# Patient Record
Sex: MALE | Race: BLACK OR AFRICAN AMERICAN | NOT HISPANIC OR LATINO | URBAN - METROPOLITAN AREA
[De-identification: names, ages, dates, MRNs, and addresses within clinical notes are randomized per-mention and may not be internally consistent; named-entity substitution may affect disease eponyms.]

---

## 2018-04-02 ENCOUNTER — OUTPATIENT (OUTPATIENT)
Dept: OUTPATIENT SERVICES | Facility: HOSPITAL | Age: 58
LOS: 1 days | End: 2018-04-02
Payer: COMMERCIAL

## 2018-04-02 DIAGNOSIS — C61 MALIGNANT NEOPLASM OF PROSTATE: ICD-10-CM

## 2018-04-02 LAB — SURGICAL PATHOLOGY STUDY: SIGNIFICANT CHANGE UP

## 2018-04-02 PROCEDURE — 88321 CONSLTJ&REPRT SLD PREP ELSWR: CPT

## 2018-04-09 ENCOUNTER — INPATIENT (INPATIENT)
Facility: HOSPITAL | Age: 58
LOS: 2 days | Discharge: HOME | DRG: 708 | End: 2018-04-12
Attending: UROLOGY | Admitting: UROLOGY
Payer: COMMERCIAL

## 2018-04-09 VITALS
WEIGHT: 193.12 LBS | DIASTOLIC BLOOD PRESSURE: 72 MMHG | TEMPERATURE: 97 F | SYSTOLIC BLOOD PRESSURE: 109 MMHG | HEIGHT: 70 IN | OXYGEN SATURATION: 98 % | RESPIRATION RATE: 18 BRPM | HEART RATE: 52 BPM

## 2018-04-09 DIAGNOSIS — C61 MALIGNANT NEOPLASM OF PROSTATE: ICD-10-CM

## 2018-04-09 DIAGNOSIS — Z98.890 OTHER SPECIFIED POSTPROCEDURAL STATES: Chronic | ICD-10-CM

## 2018-04-09 LAB
ANION GAP SERPL CALC-SCNC: 12 MMOL/L — SIGNIFICANT CHANGE UP (ref 5–17)
BUN SERPL-MCNC: 15 MG/DL — SIGNIFICANT CHANGE UP (ref 7–23)
CALCIUM SERPL-MCNC: 8.7 MG/DL — SIGNIFICANT CHANGE UP (ref 8.4–10.5)
CHLORIDE SERPL-SCNC: 100 MMOL/L — SIGNIFICANT CHANGE UP (ref 96–108)
CO2 SERPL-SCNC: 24 MMOL/L — SIGNIFICANT CHANGE UP (ref 22–31)
CREAT SERPL-MCNC: 1.25 MG/DL — SIGNIFICANT CHANGE UP (ref 0.5–1.3)
GLUCOSE SERPL-MCNC: 124 MG/DL — HIGH (ref 70–99)
HCT VFR BLD CALC: 37.2 % — LOW (ref 39–50)
HGB BLD-MCNC: 12.1 G/DL — LOW (ref 13–17)
MCHC RBC-ENTMCNC: 29.9 PG — SIGNIFICANT CHANGE UP (ref 27–34)
MCHC RBC-ENTMCNC: 32.5 G/DL — SIGNIFICANT CHANGE UP (ref 32–36)
MCV RBC AUTO: 91.9 FL — SIGNIFICANT CHANGE UP (ref 80–100)
PLATELET # BLD AUTO: 192 K/UL — SIGNIFICANT CHANGE UP (ref 150–400)
POTASSIUM SERPL-MCNC: 4.3 MMOL/L — SIGNIFICANT CHANGE UP (ref 3.5–5.3)
POTASSIUM SERPL-SCNC: 4.3 MMOL/L — SIGNIFICANT CHANGE UP (ref 3.5–5.3)
RBC # BLD: 4.05 M/UL — LOW (ref 4.2–5.8)
RBC # FLD: 11.5 % — SIGNIFICANT CHANGE UP (ref 10.3–16.9)
SODIUM SERPL-SCNC: 136 MMOL/L — SIGNIFICANT CHANGE UP (ref 135–145)
WBC # BLD: 12.3 K/UL — HIGH (ref 3.8–10.5)
WBC # FLD AUTO: 12.3 K/UL — HIGH (ref 3.8–10.5)

## 2018-04-09 RX ORDER — BENZOCAINE AND MENTHOL 5; 1 G/100ML; G/100ML
1 LIQUID ORAL
Qty: 0 | Refills: 0 | Status: DISCONTINUED | OUTPATIENT
Start: 2018-04-09 | End: 2018-04-12

## 2018-04-09 RX ORDER — HYDROMORPHONE HYDROCHLORIDE 2 MG/ML
0.5 INJECTION INTRAMUSCULAR; INTRAVENOUS; SUBCUTANEOUS ONCE
Qty: 0 | Refills: 0 | Status: DISCONTINUED | OUTPATIENT
Start: 2018-04-09 | End: 2018-04-12

## 2018-04-09 RX ORDER — ACETAMINOPHEN 500 MG
650 TABLET ORAL EVERY 6 HOURS
Qty: 0 | Refills: 0 | Status: DISCONTINUED | OUTPATIENT
Start: 2018-04-09 | End: 2018-04-12

## 2018-04-09 RX ORDER — SENNA PLUS 8.6 MG/1
2 TABLET ORAL AT BEDTIME
Qty: 0 | Refills: 0 | Status: DISCONTINUED | OUTPATIENT
Start: 2018-04-09 | End: 2018-04-12

## 2018-04-09 RX ORDER — OXYBUTYNIN CHLORIDE 5 MG
5 TABLET ORAL THREE TIMES A DAY
Qty: 0 | Refills: 0 | Status: DISCONTINUED | OUTPATIENT
Start: 2018-04-09 | End: 2018-04-12

## 2018-04-09 RX ORDER — OXYCODONE AND ACETAMINOPHEN 5; 325 MG/1; MG/1
1 TABLET ORAL EVERY 4 HOURS
Qty: 0 | Refills: 0 | Status: DISCONTINUED | OUTPATIENT
Start: 2018-04-09 | End: 2018-04-12

## 2018-04-09 RX ORDER — ONDANSETRON 8 MG/1
4 TABLET, FILM COATED ORAL EVERY 6 HOURS
Qty: 0 | Refills: 0 | Status: DISCONTINUED | OUTPATIENT
Start: 2018-04-09 | End: 2018-04-12

## 2018-04-09 RX ORDER — SODIUM CHLORIDE 9 MG/ML
1000 INJECTION INTRAMUSCULAR; INTRAVENOUS; SUBCUTANEOUS
Qty: 0 | Refills: 0 | Status: DISCONTINUED | OUTPATIENT
Start: 2018-04-09 | End: 2018-04-11

## 2018-04-09 RX ORDER — DIAZEPAM 5 MG
5 TABLET ORAL THREE TIMES A DAY
Qty: 0 | Refills: 0 | Status: DISCONTINUED | OUTPATIENT
Start: 2018-04-09 | End: 2018-04-12

## 2018-04-09 RX ORDER — CEFTRIAXONE 500 MG/1
1000 INJECTION, POWDER, FOR SOLUTION INTRAMUSCULAR; INTRAVENOUS EVERY 24 HOURS
Qty: 0 | Refills: 0 | Status: COMPLETED | OUTPATIENT
Start: 2018-04-09 | End: 2018-04-10

## 2018-04-09 RX ORDER — CEFTRIAXONE 500 MG/1
1 INJECTION, POWDER, FOR SOLUTION INTRAMUSCULAR; INTRAVENOUS EVERY 24 HOURS
Qty: 0 | Refills: 0 | Status: DISCONTINUED | OUTPATIENT
Start: 2018-04-09 | End: 2018-04-09

## 2018-04-09 RX ORDER — DOCUSATE SODIUM 100 MG
100 CAPSULE ORAL THREE TIMES A DAY
Qty: 0 | Refills: 0 | Status: DISCONTINUED | OUTPATIENT
Start: 2018-04-09 | End: 2018-04-12

## 2018-04-09 RX ORDER — OXYCODONE AND ACETAMINOPHEN 5; 325 MG/1; MG/1
2 TABLET ORAL EVERY 6 HOURS
Qty: 0 | Refills: 0 | Status: DISCONTINUED | OUTPATIENT
Start: 2018-04-09 | End: 2018-04-12

## 2018-04-09 RX ORDER — LIDOCAINE 4 G/100G
1 CREAM TOPICAL
Qty: 0 | Refills: 0 | Status: DISCONTINUED | OUTPATIENT
Start: 2018-04-09 | End: 2018-04-12

## 2018-04-09 RX ADMIN — Medication 100 MILLIGRAM(S): at 14:46

## 2018-04-09 RX ADMIN — Medication 5 MILLIGRAM(S): at 11:33

## 2018-04-09 RX ADMIN — OXYCODONE AND ACETAMINOPHEN 1 TABLET(S): 5; 325 TABLET ORAL at 19:29

## 2018-04-09 RX ADMIN — BENZOCAINE AND MENTHOL 1 LOZENGE: 5; 1 LIQUID ORAL at 22:21

## 2018-04-09 RX ADMIN — Medication 100 MILLIGRAM(S): at 21:08

## 2018-04-09 RX ADMIN — Medication 650 MILLIGRAM(S): at 16:53

## 2018-04-09 RX ADMIN — Medication 650 MILLIGRAM(S): at 14:46

## 2018-04-09 RX ADMIN — Medication 5 MILLIGRAM(S): at 18:03

## 2018-04-09 RX ADMIN — CEFTRIAXONE 1000 MILLIGRAM(S): 500 INJECTION, POWDER, FOR SOLUTION INTRAMUSCULAR; INTRAVENOUS at 16:51

## 2018-04-09 RX ADMIN — OXYCODONE AND ACETAMINOPHEN 1 TABLET(S): 5; 325 TABLET ORAL at 20:30

## 2018-04-09 RX ADMIN — Medication 5 MILLIGRAM(S): at 19:29

## 2018-04-09 RX ADMIN — Medication 5 MILLIGRAM(S): at 12:05

## 2018-04-09 RX ADMIN — SENNA PLUS 2 TABLET(S): 8.6 TABLET ORAL at 21:08

## 2018-04-09 NOTE — BRIEF OPERATIVE NOTE - PROCEDURE
<<-----Click on this checkbox to enter Procedure Lysis of adhesions  04/09/2018    Active  RUDOLPH  Prostatectomy, laparoscopic, using da Aguilar Si system  04/09/2018    Active  RUDOLPH

## 2018-04-09 NOTE — H&P ADULT - HISTORY OF PRESENT ILLNESS
73M history prostate cancer here for robotic-assisted laparoscopic prostatectomy. No new complaints since outpatient visit

## 2018-04-09 NOTE — PROGRESS NOTE ADULT - SUBJECTIVE AND OBJECTIVE BOX
POC    Patient is a 58y old  Male who presents with a chief complaint of prostate ca (09 Apr 2018 08:08) s/p RALP    Pt denies severe pain, CP, SOB, n/v        Vital Signs Last 24 Hrs  T(C): 36.3 (09 Apr 2018 05:51), Max: 36.3 (09 Apr 2018 05:51)  T(F): 97.3 (09 Apr 2018 05:51), Max: 97.3 (09 Apr 2018 05:51)  HR: 56 (09 Apr 2018 12:25) (52 - 76)  BP: 127/76 (09 Apr 2018 12:25) (109/72 - 146/74)  BP(mean): 98 (09 Apr 2018 12:25) (93 - 104)  RR: 18 (09 Apr 2018 12:25) (10 - 26)  SpO2: 100% (09 Apr 2018 12:25) (90% - 100%)    I&O's Summary    09 Apr 2018 07:01  -  09 Apr 2018 13:03  --------------------------------------------------------  IN: 0 mL / OUT: 70 mL / NET: -70 mL        Gen: NAD    Abd: incisions CDI, marlon ttp, softly distended, no guarding    : FC intact and draining yellow, nml external male genitalia                          12.1   12.3  )-----------( 192      ( 09 Apr 2018 12:03 )             37.2     04-09    136  |  100  |  15  ----------------------------<  124<H>  4.3   |  24  |  1.25    Ca    8.7      09 Apr 2018 12:03      cultures    A/P  58M s/p RALP  -c/w abx  -monitor UO, c/w FC  -DVT ppx w/SCDs and OOB  -diet: clears  -pain meds PRN  -bowel regimen  -OOB/IS

## 2018-04-09 NOTE — PROGRESS NOTE ADULT - SUBJECTIVE AND OBJECTIVE BOX
Postop:  pt denies chest pain, sob, nv or severe abdominal pain       Vital Signs Last 24 Hrs  T(C): 36.3 (09 Apr 2018 05:51), Max: 36.3 (09 Apr 2018 05:51)  T(F): 97.3 (09 Apr 2018 05:51), Max: 97.3 (09 Apr 2018 05:51)  HR: 52 (09 Apr 2018 13:10) (52 - 76)  BP: 132/80 (09 Apr 2018 13:10) (109/72 - 146/74)  BP(mean): 102 (09 Apr 2018 13:10) (93 - 104)  RR: 10 (09 Apr 2018 13:10) (10 - 26)  SpO2: 98% (09 Apr 2018 13:10) (90% - 100%)  I&O's Summary    09 Apr 2018 07:01  -  09 Apr 2018 13:17  --------------------------------------------------------  IN: 0 mL / OUT: 70 mL / NET: -70 mL        PE:  Gen: nad  Abd: softly distended, marlon ttp, incisions cdi   : johnson draining clear    LABS:                        12.1   12.3  )-----------( 192      ( 09 Apr 2018 12:03 )             37.2     04-09    136  |  100  |  15  ----------------------------<  124<H>  4.3   |  24  |  1.25    Ca    8.7      09 Apr 2018 12:03        Cultures      A/P: 58M hx cap s/p ralp 4/9  1- johnson- monitor uop  2- HOWARD- monitor output  3- clears  4- pain meds prn  5- ceftriaxone  6- OOB amb, IS

## 2018-04-10 LAB — CREAT FLD-MCNC: 1.22 MG/DL — SIGNIFICANT CHANGE UP

## 2018-04-10 RX ORDER — OXYBUTYNIN CHLORIDE 5 MG
1 TABLET ORAL
Qty: 6 | Refills: 0 | OUTPATIENT
Start: 2018-04-10 | End: 2018-04-15

## 2018-04-10 RX ORDER — DOCUSATE SODIUM 100 MG
1 CAPSULE ORAL
Qty: 14 | Refills: 0 | OUTPATIENT
Start: 2018-04-10 | End: 2018-04-16

## 2018-04-10 RX ORDER — MOXIFLOXACIN HYDROCHLORIDE TABLETS, 400 MG 400 MG/1
1 TABLET, FILM COATED ORAL
Qty: 10 | Refills: 0 | OUTPATIENT
Start: 2018-04-10 | End: 2018-04-14

## 2018-04-10 RX ADMIN — OXYCODONE AND ACETAMINOPHEN 1 TABLET(S): 5; 325 TABLET ORAL at 15:01

## 2018-04-10 RX ADMIN — OXYCODONE AND ACETAMINOPHEN 1 TABLET(S): 5; 325 TABLET ORAL at 06:11

## 2018-04-10 RX ADMIN — OXYCODONE AND ACETAMINOPHEN 1 TABLET(S): 5; 325 TABLET ORAL at 13:06

## 2018-04-10 RX ADMIN — Medication 100 MILLIGRAM(S): at 21:01

## 2018-04-10 RX ADMIN — OXYCODONE AND ACETAMINOPHEN 1 TABLET(S): 5; 325 TABLET ORAL at 05:11

## 2018-04-10 RX ADMIN — SENNA PLUS 2 TABLET(S): 8.6 TABLET ORAL at 21:01

## 2018-04-10 RX ADMIN — Medication 650 MILLIGRAM(S): at 21:18

## 2018-04-10 RX ADMIN — Medication 5 MILLIGRAM(S): at 13:06

## 2018-04-10 RX ADMIN — Medication 650 MILLIGRAM(S): at 22:18

## 2018-04-10 RX ADMIN — Medication 100 MILLIGRAM(S): at 13:06

## 2018-04-10 RX ADMIN — Medication 100 MILLIGRAM(S): at 05:05

## 2018-04-10 RX ADMIN — Medication 5 MILLIGRAM(S): at 21:18

## 2018-04-10 RX ADMIN — CEFTRIAXONE 1000 MILLIGRAM(S): 500 INJECTION, POWDER, FOR SOLUTION INTRAMUSCULAR; INTRAVENOUS at 15:54

## 2018-04-10 NOTE — DISCHARGE NOTE ADULT - MEDICATION SUMMARY - MEDICATIONS TO TAKE
I will START or STAY ON the medications listed below when I get home from the hospital:    Colace sodium 100 mg oral capsule  -- 1 cap(s) by mouth 2 times a day, As Needed -for constipation  -- Medication should be taken with plenty of water.    -- Indication: For for constipation    Cipro 500 mg oral tablet  -- 1 tab(s) by mouth every 12 hours start day before apt  -- Avoid prolonged or excessive exposure to direct and/or artificial sunlight while taking this medication.  Check with your doctor before becoming pregnant.  Do not take dairy products, antacids, or iron preparations within one hour of this medication.  Finish all this medication unless otherwise directed by prescriber.  Medication should be taken with plenty of water.    -- Indication: For infection prophylaxis    Ditropan XL 15 mg/24 hr oral tablet, extended release  -- 1 tab(s) by mouth once a day, As Needed -for bladder spasms stop day before apt  -- May cause drowsiness.  Alcohol may intensify this effect.  Use care when operating dangerous machinery.  Swallow whole.  Do not crush.    -- Indication: For Bladder spasms I will START or STAY ON the medications listed below when I get home from the hospital:    Colace 100 mg oral capsule  -- 1 cap(s) by mouth 2 times a day -for constipation MDD:2   -- Medication should be taken with plenty of water.    -- Indication: For stool softener    Cipro 500 mg oral tablet  -- 1 tab(s) by mouth every 12 hours MDD:2  -- Avoid prolonged or excessive exposure to direct and/or artificial sunlight while taking this medication.  Check with your doctor before becoming pregnant.  Do not take dairy products, antacids, or iron preparations within one hour of this medication.  Finish all this medication unless otherwise directed by prescriber.  Medication should be taken with plenty of water.    -- Indication: For antibiotic to begin one day prior to follow up visit with Dr. Hawkins    oxybutynin 5 mg oral tablet  -- 1 tab(s) by mouth 3 times a day -for bladder spasms MDD:3   -- May cause drowsiness.  Alcohol may intensify this effect.  Use care when operating dangerous machinery.    -- Indication: For for bladder spasms. Stop taking one day prior to follow up with Dr. Hawkins.

## 2018-04-10 NOTE — DISCHARGE NOTE ADULT - CARE PLAN
Principal Discharge DX:	Prostate cancer  Goal:	improvement after surgery  Assessment and plan of treatment:	Advance diet with BM. Activity as tolerated. Aguilera to leg bag. No heavy lifting or straining. Keep incisions CDI. If fever >100.4 or any change or worsening of symptoms please call doctor or report to ED. Make follow up appointment with Dr Hawkins call office [phone]

## 2018-04-10 NOTE — PROGRESS NOTE ADULT - SUBJECTIVE AND OBJECTIVE BOX
AM Note    No acute events overnight.      Vital Signs Last 24 Hrs  T(C): 37.2 (04-10-18 @ 01:14), Max: 37.4 (04-09-18 @ 21:14)  T(F): 99 (04-10-18 @ 01:14), Max: 99.3 (04-09-18 @ 21:14)  HR: 68 (04-10-18 @ 01:14) (52 - 76)  BP: 110/85 (04-10-18 @ 01:14) (109/72 - 153/83)  BP(mean): 97 (04-09-18 @ 13:40) (93 - 104)  RR: 18 (04-10-18 @ 01:14) (10 - 26)  SpO2: 97% (04-10-18 @ 01:14) (90% - 100%)     09 Apr 2018 12:03    136    |  100    |  15     ----------------------------<  124    4.3     |  24     |  1.25     Ca    8.7        09 Apr 2018 12:03                            12.1   12.3  )-----------( 192      ( 09 Apr 2018 12:03 )             37.2         I&O's Summary    09 Apr 2018 07:01  -  10 Apr 2018 04:17  --------------------------------------------------------  IN: 2000 mL / OUT: 2595 mL / NET: -595 mL          PHYSICAL EXAM:    GEN: nad  ABD: soft, ntnd, incsions cdi, HOWARD in place draining  : elizabeth in place draining clear

## 2018-04-10 NOTE — PROGRESS NOTE ADULT - PROBLEM SELECTOR PLAN 1
- ceftriaxone  -CLD  -johnson, monitor uop  -HOWARD drain, monitor op  -Surgery following for poss bowel injury  dvt ppx, SCDs,   -continue present care

## 2018-04-10 NOTE — DISCHARGE NOTE ADULT - HOSPITAL COURSE
58M history cap underwent RALP 4/9 . Tolerated procedure well. VSS, afebrile and hemodynamically stable. 58M history cap underwent RALP 4/9 . Tolerated procedure well. VSS, afebrile and hemodynamically stable. Homw with johnson to leg bag to be removed as outpt. HOWARD removed prior to discharge.

## 2018-04-10 NOTE — DISCHARGE NOTE ADULT - CARE PROVIDER_API CALL
Ozzie Hawkins), Urology  [address] [place] [address], [place] [id number]  Phone: [phone]  Fax: [phone]

## 2018-04-10 NOTE — DISCHARGE NOTE ADULT - PATIENT PORTAL LINK FT
You can access the [facility] Patient Portal, offered by [facility], by registering with the following website: [email or web address]

## 2018-04-10 NOTE — DISCHARGE NOTE ADULT - PLAN OF CARE
improvement after surgery Advance diet with BM. Activity as tolerated. Aguilera to leg bag. No heavy lifting or straining. Keep incisions CDI. If fever >100.4 or any change or worsening of symptoms please call doctor or report to ED. Make follow up appointment with Dr Hawkins call office [phone]

## 2018-04-11 LAB
ANION GAP SERPL CALC-SCNC: 10 MMOL/L — SIGNIFICANT CHANGE UP (ref 5–17)
BASOPHILS NFR BLD AUTO: 0.2 % — SIGNIFICANT CHANGE UP (ref 0–2)
BUN SERPL-MCNC: 8 MG/DL — SIGNIFICANT CHANGE UP (ref 7–23)
CALCIUM SERPL-MCNC: 8.8 MG/DL — SIGNIFICANT CHANGE UP (ref 8.4–10.5)
CHLORIDE SERPL-SCNC: 102 MMOL/L — SIGNIFICANT CHANGE UP (ref 96–108)
CO2 SERPL-SCNC: 28 MMOL/L — SIGNIFICANT CHANGE UP (ref 22–31)
CREAT FLD-MCNC: 1.22 MG/DL — SIGNIFICANT CHANGE UP
CREAT SERPL-MCNC: 1.12 MG/DL — SIGNIFICANT CHANGE UP (ref 0.5–1.3)
EOSINOPHIL NFR BLD AUTO: 0.4 % — SIGNIFICANT CHANGE UP (ref 0–6)
GLUCOSE SERPL-MCNC: 117 MG/DL — HIGH (ref 70–99)
HCT VFR BLD CALC: 39.7 % — SIGNIFICANT CHANGE UP (ref 39–50)
HGB BLD-MCNC: 12.6 G/DL — LOW (ref 13–17)
LYMPHOCYTES # BLD AUTO: 17.8 % — SIGNIFICANT CHANGE UP (ref 13–44)
MAGNESIUM SERPL-MCNC: 1.8 MG/DL — SIGNIFICANT CHANGE UP (ref 1.6–2.6)
MCHC RBC-ENTMCNC: 29 PG — SIGNIFICANT CHANGE UP (ref 27–34)
MCHC RBC-ENTMCNC: 31.7 G/DL — LOW (ref 32–36)
MCV RBC AUTO: 91.3 FL — SIGNIFICANT CHANGE UP (ref 80–100)
MONOCYTES NFR BLD AUTO: 13.3 % — SIGNIFICANT CHANGE UP (ref 2–14)
NEUTROPHILS NFR BLD AUTO: 68.3 % — SIGNIFICANT CHANGE UP (ref 43–77)
PHOSPHATE SERPL-MCNC: 3.3 MG/DL — SIGNIFICANT CHANGE UP (ref 2.5–4.5)
PLATELET # BLD AUTO: 164 K/UL — SIGNIFICANT CHANGE UP (ref 150–400)
POTASSIUM SERPL-MCNC: 4.2 MMOL/L — SIGNIFICANT CHANGE UP (ref 3.5–5.3)
POTASSIUM SERPL-SCNC: 4.2 MMOL/L — SIGNIFICANT CHANGE UP (ref 3.5–5.3)
RBC # BLD: 4.35 M/UL — SIGNIFICANT CHANGE UP (ref 4.2–5.8)
RBC # FLD: 12 % — SIGNIFICANT CHANGE UP (ref 10.3–16.9)
SODIUM SERPL-SCNC: 140 MMOL/L — SIGNIFICANT CHANGE UP (ref 135–145)
WBC # BLD: 5.8 K/UL — SIGNIFICANT CHANGE UP (ref 3.8–10.5)
WBC # FLD AUTO: 5.8 K/UL — SIGNIFICANT CHANGE UP (ref 3.8–10.5)

## 2018-04-11 RX ORDER — MAGNESIUM OXIDE 400 MG ORAL TABLET 241.3 MG
400 TABLET ORAL ONCE
Qty: 0 | Refills: 0 | Status: COMPLETED | OUTPATIENT
Start: 2018-04-11 | End: 2018-04-11

## 2018-04-11 RX ADMIN — Medication 100 MILLIGRAM(S): at 17:23

## 2018-04-11 RX ADMIN — Medication 650 MILLIGRAM(S): at 06:02

## 2018-04-11 RX ADMIN — Medication 10 MILLIGRAM(S): at 08:48

## 2018-04-11 RX ADMIN — MAGNESIUM OXIDE 400 MG ORAL TABLET 400 MILLIGRAM(S): 241.3 TABLET ORAL at 08:48

## 2018-04-11 RX ADMIN — Medication 5 MILLIGRAM(S): at 05:02

## 2018-04-11 RX ADMIN — SENNA PLUS 2 TABLET(S): 8.6 TABLET ORAL at 21:11

## 2018-04-11 RX ADMIN — Medication 100 MILLIGRAM(S): at 05:02

## 2018-04-11 RX ADMIN — Medication 100 MILLIGRAM(S): at 21:11

## 2018-04-11 RX ADMIN — Medication 650 MILLIGRAM(S): at 05:02

## 2018-04-11 NOTE — PROGRESS NOTE ADULT - SUBJECTIVE AND OBJECTIVE BOX
GENERAL SURGERY CONSULT PROGRESS NOTE    SUBJECTIVE:   Pt seen & examined at bedside. Tolerated CLD, Soft for lunch, passing flatus, BM this am. Pain controlled. No complaints. No F/C, N/V, CP/SOB.    MEDICATIONS:  ---NEURO-  acetaminophen   Tablet 650 milliGRAM(s) Oral every 6 hours PRN  acetaminophen   Tablet. 650 milliGRAM(s) Oral every 6 hours PRN  diazepam    Tablet 5 milliGRAM(s) Oral three times a day PRN  HYDROmorphone  Injectable 0.5 milliGRAM(s) IV Push once PRN  ondansetron Injectable 4 milliGRAM(s) IV Push every 6 hours PRN  oxyCODONE    5 mG/acetaminophen 325 mG 1 Tablet(s) Oral every 4 hours PRN  oxyCODONE    5 mG/acetaminophen 325 mG 2 Tablet(s) Oral every 6 hours PRN  ---CV-  ---PULM-  ---GI/FEN-  docusate sodium 100 milliGRAM(s) Oral three times a day  senna 2 Tablet(s) Oral at bedtime  ----  oxybutynin 5 milliGRAM(s) Oral three times a day PRN  ---ID-   ---ENDO-  ---HEME-  ---OTHER-  benzocaine 15 mG/menthol 3.6 mG Lozenge 1 Lozenge Oral two times a day PRN  lidocaine 2% Gel 1 Application(s) Topical every 3 hours PRN        VOLUME STATUS:  I&O's Detail    10 Apr 2018 07:01  -  11 Apr 2018 07:00  --------------------------------------------------------  IN:  Total IN: 0 mL    OUT:    Bulb: 230 mL    Indwelling Catheter - Urethral: 3670 mL  Total OUT: 3900 mL    Total NET: -3900 mL      11 Apr 2018 07:01  -  11 Apr 2018 13:55  --------------------------------------------------------  IN:  Total IN: 0 mL    OUT:    Bulb: 75 mL    Indwelling Catheter - Urethral: 700 mL  Total OUT: 775 mL    Total NET: -775 mL          VITALS:  Vital Signs Last 24 Hrs  T(C): 36.4 (11 Apr 2018 09:18), Max: 37.3 (10 Apr 2018 16:44)  T(F): 97.6 (11 Apr 2018 09:18), Max: 99.2 (11 Apr 2018 05:01)  HR: 55 (11 Apr 2018 09:18) (54 - 62)  BP: 110/62 (11 Apr 2018 09:18) (110/62 - 142/78)  BP(mean): --  RR: 17 (11 Apr 2018 09:18) (17 - 18)  SpO2: 100% (11 Apr 2018 09:18) (96% - 100%)    PHYSICAL EXAM:  General: A/O x4 NAD  ABD: soft NT ND HOWARD in place       LABS:                        12.6   5.8   )-----------( 164      ( 11 Apr 2018 06:02 )             39.7     04-11    140  |  102  |  8   ----------------------------<  117<H>  4.2   |  28  |  1.12    Ca    8.8      11 Apr 2018 06:02  Phos  3.3     04-11  Mg     1.8     04-11

## 2018-04-11 NOTE — PROGRESS NOTE ADULT - PROBLEM SELECTOR PLAN 1
- Diet:clears  - Pain control  - Monitor Aguilera Urine Output  - Monitor HOWARD output   - DVT ppx  - OOB/IS  -bowel regiment  - continue plan of care

## 2018-04-11 NOTE — PROGRESS NOTE ADULT - SUBJECTIVE AND OBJECTIVE BOX
INTERVAL HPI/OVERNIGHT EVENTS:  Pt is a 58 year old male with CaP s/p RALP (4/9).   No acute events overnight.    VITALS:    T(F): 99.2 (04-11-18 @ 05:01), Max: 99.2 (04-11-18 @ 05:01)  HR: 62 (04-11-18 @ 05:01) (54 - 71)  BP: 127/71 (04-11-18 @ 05:01) (115/74 - 142/78)  RR: 18 (04-11-18 @ 05:01) (18 - 18)  SpO2: 98% (04-11-18 @ 05:01) (96% - 99%)  Wt(kg): --    I&O's Detail    09 Apr 2018 07:01  -  10 Apr 2018 07:00  --------------------------------------------------------  IN:    sodium chloride 0.9%.: 2000 mL  Total IN: 2000 mL    OUT:    Bulb: 265 mL    Indwelling Catheter - Urethral: 4700 mL  Total OUT: 4965 mL    Total NET: -2965 mL      10 Apr 2018 07:01  -  11 Apr 2018 06:11  --------------------------------------------------------  IN:  Total IN: 0 mL    OUT:    Bulb: 230 mL    Indwelling Catheter - Urethral: 3670 mL  Total OUT: 3900 mL    Total NET: -3900 mL          MEDICATIONS:    ANTIBIOTICS:      PAIN CONTROL:  acetaminophen   Tablet 650 milliGRAM(s) Oral every 6 hours PRN  acetaminophen   Tablet. 650 milliGRAM(s) Oral every 6 hours PRN  diazepam    Tablet 5 milliGRAM(s) Oral three times a day PRN  HYDROmorphone  Injectable 0.5 milliGRAM(s) IV Push once PRN  ondansetron Injectable 4 milliGRAM(s) IV Push every 6 hours PRN  oxyCODONE    5 mG/acetaminophen 325 mG 1 Tablet(s) Oral every 4 hours PRN  oxyCODONE    5 mG/acetaminophen 325 mG 2 Tablet(s) Oral every 6 hours PRN       MEDS:  oxybutynin 5 milliGRAM(s) Oral three times a day PRN      HEME/ONC        PHYSICAL EXAM:  General: No acute distress.  Alert and Oriented  Abdominal Exam: appropriately tender, appropriately distended. HOWARD drain noted with serosanguinous fluid.      Exam: Aguilera noted draining yellow/clear urine.      LABS:                        12.6   7.1   )-----------( 199      ( 10 Apr 2018 11:11 )             38.3     04-10    136  |  101  |  13  ----------------------------<  169<H>  4.0   |  23  |  1.21    Ca    8.7      10 Apr 2018 11:11  Phos  2.9     04-10  Mg     2.0     04-10            RADIOLOGY & ADDITIONAL TESTS:

## 2018-04-11 NOTE — PROGRESS NOTE ADULT - ASSESSMENT
General Surgery (Seema): called as intraop consult for evaluation of possible colon injury during abdominal entry for robotic prostatectomy.  Transverse colon with no injury to colon.  Fascia closed   -soft diet  -Advance diet as tolerated  -Surgery to sign off, please reconsult with any questions

## 2018-04-12 VITALS
SYSTOLIC BLOOD PRESSURE: 114 MMHG | TEMPERATURE: 98 F | HEART RATE: 58 BPM | DIASTOLIC BLOOD PRESSURE: 63 MMHG | RESPIRATION RATE: 17 BRPM | OXYGEN SATURATION: 100 %

## 2018-04-12 LAB
ANION GAP SERPL CALC-SCNC: 11 MMOL/L — SIGNIFICANT CHANGE UP (ref 5–17)
BASOPHILS NFR BLD AUTO: 0.3 % — SIGNIFICANT CHANGE UP (ref 0–2)
BUN SERPL-MCNC: 10 MG/DL — SIGNIFICANT CHANGE UP (ref 7–23)
CALCIUM SERPL-MCNC: 8.5 MG/DL — SIGNIFICANT CHANGE UP (ref 8.4–10.5)
CHLORIDE SERPL-SCNC: 101 MMOL/L — SIGNIFICANT CHANGE UP (ref 96–108)
CO2 SERPL-SCNC: 25 MMOL/L — SIGNIFICANT CHANGE UP (ref 22–31)
CREAT SERPL-MCNC: 1.12 MG/DL — SIGNIFICANT CHANGE UP (ref 0.5–1.3)
EOSINOPHIL NFR BLD AUTO: 4.9 % — SIGNIFICANT CHANGE UP (ref 0–6)
GLUCOSE SERPL-MCNC: 109 MG/DL — HIGH (ref 70–99)
HCT VFR BLD CALC: 38.1 % — LOW (ref 39–50)
HGB BLD-MCNC: 12.2 G/DL — LOW (ref 13–17)
LYMPHOCYTES # BLD AUTO: 23.3 % — SIGNIFICANT CHANGE UP (ref 13–44)
MAGNESIUM SERPL-MCNC: 1.6 MG/DL — SIGNIFICANT CHANGE UP (ref 1.6–2.6)
MCHC RBC-ENTMCNC: 29.2 PG — SIGNIFICANT CHANGE UP (ref 27–34)
MCHC RBC-ENTMCNC: 32 G/DL — SIGNIFICANT CHANGE UP (ref 32–36)
MCV RBC AUTO: 91.1 FL — SIGNIFICANT CHANGE UP (ref 80–100)
MONOCYTES NFR BLD AUTO: 12.8 % — SIGNIFICANT CHANGE UP (ref 2–14)
NEUTROPHILS NFR BLD AUTO: 58.7 % — SIGNIFICANT CHANGE UP (ref 43–77)
PHOSPHATE SERPL-MCNC: 3.6 MG/DL — SIGNIFICANT CHANGE UP (ref 2.5–4.5)
PLATELET # BLD AUTO: 181 K/UL — SIGNIFICANT CHANGE UP (ref 150–400)
POTASSIUM SERPL-MCNC: 3.8 MMOL/L — SIGNIFICANT CHANGE UP (ref 3.5–5.3)
POTASSIUM SERPL-SCNC: 3.8 MMOL/L — SIGNIFICANT CHANGE UP (ref 3.5–5.3)
RBC # BLD: 4.18 M/UL — LOW (ref 4.2–5.8)
RBC # FLD: 11.7 % — SIGNIFICANT CHANGE UP (ref 10.3–16.9)
SODIUM SERPL-SCNC: 137 MMOL/L — SIGNIFICANT CHANGE UP (ref 135–145)
SURGICAL PATHOLOGY STUDY: SIGNIFICANT CHANGE UP
WBC # BLD: 6.2 K/UL — SIGNIFICANT CHANGE UP (ref 3.8–10.5)
WBC # FLD AUTO: 6.2 K/UL — SIGNIFICANT CHANGE UP (ref 3.8–10.5)

## 2018-04-12 PROCEDURE — 88307 TISSUE EXAM BY PATHOLOGIST: CPT

## 2018-04-12 PROCEDURE — 88309 TISSUE EXAM BY PATHOLOGIST: CPT

## 2018-04-12 PROCEDURE — 84100 ASSAY OF PHOSPHORUS: CPT

## 2018-04-12 PROCEDURE — 86900 BLOOD TYPING SEROLOGIC ABO: CPT

## 2018-04-12 PROCEDURE — 85025 COMPLETE CBC W/AUTO DIFF WBC: CPT

## 2018-04-12 PROCEDURE — 86901 BLOOD TYPING SEROLOGIC RH(D): CPT

## 2018-04-12 PROCEDURE — 86850 RBC ANTIBODY SCREEN: CPT

## 2018-04-12 PROCEDURE — 85027 COMPLETE CBC AUTOMATED: CPT

## 2018-04-12 PROCEDURE — S2900: CPT

## 2018-04-12 PROCEDURE — 83735 ASSAY OF MAGNESIUM: CPT

## 2018-04-12 PROCEDURE — 36415 COLL VENOUS BLD VENIPUNCTURE: CPT

## 2018-04-12 PROCEDURE — 82570 ASSAY OF URINE CREATININE: CPT

## 2018-04-12 PROCEDURE — 80048 BASIC METABOLIC PNL TOTAL CA: CPT

## 2018-04-12 RX ORDER — OXYBUTYNIN CHLORIDE 5 MG
1 TABLET ORAL
Qty: 30 | Refills: 0 | OUTPATIENT
Start: 2018-04-12 | End: 2018-04-21

## 2018-04-12 RX ORDER — DOCUSATE SODIUM 100 MG
1 CAPSULE ORAL
Qty: 20 | Refills: 0 | OUTPATIENT
Start: 2018-04-12 | End: 2018-04-21

## 2018-04-12 RX ORDER — MOXIFLOXACIN HYDROCHLORIDE TABLETS, 400 MG 400 MG/1
1 TABLET, FILM COATED ORAL
Qty: 14 | Refills: 0 | OUTPATIENT
Start: 2018-04-12 | End: 2018-04-18

## 2018-04-12 RX ORDER — MAGNESIUM OXIDE 400 MG ORAL TABLET 241.3 MG
400 TABLET ORAL ONCE
Qty: 0 | Refills: 0 | Status: COMPLETED | OUTPATIENT
Start: 2018-04-12 | End: 2018-04-12

## 2018-04-12 RX ADMIN — Medication 650 MILLIGRAM(S): at 05:05

## 2018-04-12 RX ADMIN — Medication 100 MILLIGRAM(S): at 05:05

## 2018-04-12 RX ADMIN — MAGNESIUM OXIDE 400 MG ORAL TABLET 400 MILLIGRAM(S): 241.3 TABLET ORAL at 06:55

## 2018-04-12 RX ADMIN — Medication 650 MILLIGRAM(S): at 06:05

## 2018-04-12 RX ADMIN — Medication 5 MILLIGRAM(S): at 05:05

## 2018-04-12 NOTE — PROGRESS NOTE ADULT - PROBLEM SELECTOR PLAN 1
-Diet: soft  -Pain control  -Monitor Aguilera Urine Output  -Monitor HOWARD output  -DVT ppx: SCD  -OOB/IS

## 2018-04-12 NOTE — PROGRESS NOTE ADULT - SUBJECTIVE AND OBJECTIVE BOX
INTERVAL HPI/OVERNIGHT EVENTS:  Pt is a 58 year old male with CaP s/p RALP (4/9). Pt had No acute events overnight.    VITALS:    T(F): 98.6 (04-12-18 @ 05:07), Max: 99.1 (04-11-18 @ 14:14)  HR: 63 (04-12-18 @ 05:07) (55 - 73)  BP: 106/71 (04-12-18 @ 05:07) (94/60 - 124/70)  RR: 18 (04-12-18 @ 05:07) (16 - 18)  SpO2: 99% (04-12-18 @ 05:07) (99% - 100%)  Wt(kg): --    I&O's Detail    10 Apr 2018 07:01  -  11 Apr 2018 07:00  --------------------------------------------------------  IN:  Total IN: 0 mL    OUT:    Bulb: 230 mL    Indwelling Catheter - Urethral: 3670 mL  Total OUT: 3900 mL    Total NET: -3900 mL      11 Apr 2018 07:01  -  12 Apr 2018 06:12  --------------------------------------------------------  IN:  Total IN: 0 mL    OUT:    Bulb: 170 mL    Indwelling Catheter - Urethral: 2350 mL  Total OUT: 2520 mL    Total NET: -2520 mL          MEDICATIONS:    ANTIBIOTICS:      PAIN CONTROL:  acetaminophen   Tablet 650 milliGRAM(s) Oral every 6 hours PRN  acetaminophen   Tablet. 650 milliGRAM(s) Oral every 6 hours PRN  diazepam    Tablet 5 milliGRAM(s) Oral three times a day PRN  HYDROmorphone  Injectable 0.5 milliGRAM(s) IV Push once PRN  ondansetron Injectable 4 milliGRAM(s) IV Push every 6 hours PRN  oxyCODONE    5 mG/acetaminophen 325 mG 1 Tablet(s) Oral every 4 hours PRN  oxyCODONE    5 mG/acetaminophen 325 mG 2 Tablet(s) Oral every 6 hours PRN       MEDS:  oxybutynin 5 milliGRAM(s) Oral three times a day PRN      HEME/ONC        PHYSICAL EXAM:  General: No acute distress.  Alert and Oriented    Abdominal Exam: appropriately tender, and distended.  HOWARD noted draining serosanguinous fluid.      Exam: Aguilera noted draining yellow/clear urine.      LABS:                        12.6   5.8   )-----------( 164      ( 11 Apr 2018 06:02 )             39.7     04-11    140  |  102  |  8   ----------------------------<  117<H>  4.2   |  28  |  1.12    Ca    8.8      11 Apr 2018 06:02  Phos  3.3     04-11  Mg     1.8     04-11            RADIOLOGY & ADDITIONAL TESTS:

## 2018-04-16 DIAGNOSIS — C61 MALIGNANT NEOPLASM OF PROSTATE: ICD-10-CM

## 2018-04-16 DIAGNOSIS — N32.89 OTHER SPECIFIED DISORDERS OF BLADDER: ICD-10-CM

## 2018-04-16 DIAGNOSIS — K40.90 UNILATERAL INGUINAL HERNIA, WITHOUT OBSTRUCTION OR GANGRENE, NOT SPECIFIED AS RECURRENT: ICD-10-CM

## 2018-04-18 ENCOUNTER — OUTPATIENT (OUTPATIENT)
Dept: OUTPATIENT SERVICES | Facility: HOSPITAL | Age: 58
LOS: 1 days | Discharge: HOME | End: 2018-04-18
Payer: COMMERCIAL

## 2018-04-18 VITALS
SYSTOLIC BLOOD PRESSURE: 119 MMHG | OXYGEN SATURATION: 100 % | HEART RATE: 74 BPM | WEIGHT: 189.6 LBS | HEIGHT: 70 IN | RESPIRATION RATE: 16 BRPM | TEMPERATURE: 98 F | DIASTOLIC BLOOD PRESSURE: 71 MMHG

## 2018-04-18 VITALS
SYSTOLIC BLOOD PRESSURE: 115 MMHG | OXYGEN SATURATION: 99 % | HEART RATE: 71 BPM | DIASTOLIC BLOOD PRESSURE: 57 MMHG | RESPIRATION RATE: 13 BRPM

## 2018-04-18 DIAGNOSIS — Z98.890 OTHER SPECIFIED POSTPROCEDURAL STATES: Chronic | ICD-10-CM

## 2018-04-18 PROCEDURE — 74455 X-RAY URETHRA/BLADDER: CPT

## 2018-04-18 PROCEDURE — 51600 INJECTION FOR BLADDER X-RAY: CPT

## 2018-04-18 RX ORDER — PHENAZOPYRIDINE HCL 100 MG
200 TABLET ORAL ONCE
Qty: 0 | Refills: 0 | Status: DISCONTINUED | OUTPATIENT
Start: 2018-04-18 | End: 2018-04-18

## 2018-04-18 RX ORDER — SODIUM CHLORIDE 9 MG/ML
1000 INJECTION INTRAMUSCULAR; INTRAVENOUS; SUBCUTANEOUS
Qty: 0 | Refills: 0 | Status: DISCONTINUED | OUTPATIENT
Start: 2018-04-18 | End: 2018-04-18

## 2024-01-04 NOTE — ASU PREOP CHECKLIST - VIA
----- Message from Ciara Boucher sent at 1/3/2024  2:20 PM CST -----  Contact: Self/ [phone]  Type: Forms    What type of form?Form for Diabetic     Was the form dropped off or mailed?dropped off     When was the form dropped off or mailed?12/15    If dropped off, who was the form given to?the doctor     Would you prefer an answer via [employer]?Would like a return call     Comments:    
Paperwork completed patient aware. Will leave paperwork at check in. LB  
ambulate